# Patient Record
Sex: MALE | Race: BLACK OR AFRICAN AMERICAN | NOT HISPANIC OR LATINO | Employment: FULL TIME | URBAN - METROPOLITAN AREA
[De-identification: names, ages, dates, MRNs, and addresses within clinical notes are randomized per-mention and may not be internally consistent; named-entity substitution may affect disease eponyms.]

---

## 2018-07-09 ENCOUNTER — HOSPITAL ENCOUNTER (EMERGENCY)
Facility: HOSPITAL | Age: 28
Discharge: HOME | End: 2018-07-09
Attending: EMERGENCY MEDICINE

## 2018-07-09 VITALS
OXYGEN SATURATION: 99 % | BODY MASS INDEX: 22.35 KG/M2 | HEART RATE: 58 BPM | TEMPERATURE: 99 F | DIASTOLIC BLOOD PRESSURE: 70 MMHG | RESPIRATION RATE: 16 BRPM | WEIGHT: 165 LBS | SYSTOLIC BLOOD PRESSURE: 125 MMHG | HEIGHT: 72 IN

## 2018-07-09 DIAGNOSIS — K04.7 DENTAL ABSCESS: Primary | ICD-10-CM

## 2018-07-09 PROCEDURE — 99283 EMERGENCY DEPT VISIT LOW MDM: CPT | Mod: 25

## 2018-07-09 PROCEDURE — 41800 DRAINAGE OF GUM LESION: CPT

## 2018-07-09 PROCEDURE — 25000003 PHARM REV CODE 250: Performed by: PHYSICIAN ASSISTANT

## 2018-07-09 RX ORDER — CLINDAMYCIN HYDROCHLORIDE 150 MG/1
450 CAPSULE ORAL EVERY 8 HOURS
Qty: 90 CAPSULE | Refills: 0 | Status: SHIPPED | OUTPATIENT
Start: 2018-07-09 | End: 2018-07-19

## 2018-07-09 RX ORDER — OXYCODONE AND ACETAMINOPHEN 5; 325 MG/1; MG/1
1 TABLET ORAL EVERY 6 HOURS PRN
Qty: 12 TABLET | Refills: 0 | Status: SHIPPED | OUTPATIENT
Start: 2018-07-09

## 2018-07-09 RX ORDER — CLINDAMYCIN HYDROCHLORIDE 150 MG/1
450 CAPSULE ORAL
Status: COMPLETED | OUTPATIENT
Start: 2018-07-09 | End: 2018-07-09

## 2018-07-09 RX ORDER — IBUPROFEN 600 MG/1
600 TABLET ORAL EVERY 6 HOURS PRN
Qty: 20 TABLET | Refills: 0 | Status: SHIPPED | OUTPATIENT
Start: 2018-07-09

## 2018-07-09 RX ORDER — CHLORHEXIDINE GLUCONATE ORAL RINSE 1.2 MG/ML
15 SOLUTION DENTAL 2 TIMES DAILY
Qty: 120 ML | Refills: 1 | Status: SHIPPED | OUTPATIENT
Start: 2018-07-09 | End: 2018-07-19

## 2018-07-09 RX ADMIN — CLINDAMYCIN HYDROCHLORIDE 450 MG: 150 CAPSULE ORAL at 05:07

## 2018-07-09 NOTE — ED TRIAGE NOTES
Pt c/o facial swelling, dental pain, abscess to left upper side of mouth. Pain is 3/10 at this time. Denies respiratory distress. No other complaints at this time. Pt has been taking ibuprofen to no relief

## 2018-07-09 NOTE — ED PROVIDER NOTES
"Encounter Date: 7/9/2018       History     Chief Complaint   Patient presents with    Mouth Lesions     "my face is swollen bc I have an abscess on the inside of my left cheek"     27yo M with chief complaint dental pain x years. Pt states over the past 2-3 days experienced L facial swelling. No fever. No change in diet or PO intake. No n/v. No SOB or difficulty with respirations. No neck pain or stiffness. No radiation of pain. No alleviating or exacerbating factors. Symptoms constant.           Review of patient's allergies indicates:   Allergen Reactions    Penicillins      Past Medical History:   Diagnosis Date    Asthma      History reviewed. No pertinent surgical history.  History reviewed. No pertinent family history.  Social History   Substance Use Topics    Smoking status: Current Some Day Smoker    Smokeless tobacco: Not on file    Alcohol use No     Review of Systems   Constitutional: Negative for chills and fever.   HENT: Positive for dental problem and facial swelling. Negative for ear discharge, ear pain and sore throat.    Respiratory: Negative for shortness of breath.    Cardiovascular: Negative for chest pain.   Gastrointestinal: Negative for nausea and vomiting.   Genitourinary: Negative for dysuria.   Musculoskeletal: Negative for back pain, neck pain and neck stiffness.   Skin: Negative for rash.   Neurological: Negative for weakness.   Hematological: Does not bruise/bleed easily.   All other systems reviewed and are negative.      Physical Exam     Initial Vitals [07/09/18 1700]   BP Pulse Resp Temp SpO2   125/70 (!) 58 16 98.8 °F (37.1 °C) 99 %      MAP       --         Physical Exam    Nursing note and vitals reviewed.  Constitutional: He appears well-developed and well-nourished. He is not diaphoretic. No distress.   HENT:   Head: Normocephalic and atraumatic.       Mouth/Throat:       Eyes: Conjunctivae and EOM are normal. Pupils are equal, round, and reactive to light.   Neck: Normal " range of motion. Neck supple.   Cardiovascular: Intact distal pulses.   Pulmonary/Chest: Breath sounds normal. No respiratory distress. He has no wheezes.   Abdominal: Soft. Bowel sounds are normal. He exhibits no distension. There is no tenderness.   Musculoskeletal: Normal range of motion. He exhibits no tenderness.   Neurological: He is alert and oriented to person, place, and time. He has normal strength.   Skin: Skin is warm and dry. Capillary refill takes less than 2 seconds. No rash and no abscess noted. No erythema.   Psychiatric: He has a normal mood and affect. His behavior is normal. Judgment and thought content normal.         ED Course   I & D - Incision and Drainage  Date/Time: 7/9/2018 6:11 PM  Performed by: KIMBERLEY CAR  Authorized by: [facility]   Type: abscess  Body area: mouth  Patient sedated: no  Scalpel size: 25g needle.  Incision type: single straight  Complexity: simple  Drainage: pus and  serosanguinous  Drainage amount: moderate  Wound treatment: incision,  drainage and  expression of material  Complications: No  Specimens: No  Implants: No        Labs Reviewed - No data to display        Medical Decision Making:   Differential Diagnosis:   Dental abscess, gingivitis, periodontitis, tooth decay, yelena's angina  ED Management:  27yo M with tooth pain, facial swelling. No fever. Vitals reassuring. Resting comfortably, nontoxic. Obvious abscess, incised, expression of copious purulent/serosanguinous fluid from root of tooth. No evidence yelena's angina or airway compromise. I do think pt can be managed safely as an outpatient. Pen allergy as child, will d/c with clindamycin, have pt f/u with dentist.   Other:   I have discussed this case with another health care provider.       <> Summary of the Discussion: Dr. Desai                      Clinical Impression:   The encounter diagnosis was Dental abscess.      Disposition:   Disposition: Discharged  Condition:  Stable                        Royer Corcoran PA-C  07/09/18 1821

## 2018-07-09 NOTE — DISCHARGE INSTRUCTIONS
Follow-up with dentist. Take all antibiotics as prescribed. Ibuprofen for pain. Percocet for pain unresponsive to Ibuprofen. Peridex twice daily. Return to this ED if you begin with worsening pain, if facial swelling worsens, if you begin with fever, if any other problems occur.